# Patient Record
Sex: FEMALE | Race: WHITE
[De-identification: names, ages, dates, MRNs, and addresses within clinical notes are randomized per-mention and may not be internally consistent; named-entity substitution may affect disease eponyms.]

---

## 2017-09-24 ENCOUNTER — HOSPITAL ENCOUNTER (INPATIENT)
Dept: HOSPITAL 31 - C.ER | Age: 82
LOS: 4 days | Discharge: HOME | DRG: 292 | End: 2017-09-28
Payer: MEDICARE

## 2017-09-24 VITALS — BODY MASS INDEX: 39 KG/M2

## 2017-09-24 DIAGNOSIS — Z79.82: ICD-10-CM

## 2017-09-24 DIAGNOSIS — J45.901: ICD-10-CM

## 2017-09-24 DIAGNOSIS — Z79.51: ICD-10-CM

## 2017-09-24 DIAGNOSIS — E78.00: ICD-10-CM

## 2017-09-24 DIAGNOSIS — Z99.81: ICD-10-CM

## 2017-09-24 DIAGNOSIS — I48.0: ICD-10-CM

## 2017-09-24 DIAGNOSIS — J44.9: ICD-10-CM

## 2017-09-24 DIAGNOSIS — I27.2: ICD-10-CM

## 2017-09-24 DIAGNOSIS — E66.01: ICD-10-CM

## 2017-09-24 DIAGNOSIS — Z79.899: ICD-10-CM

## 2017-09-24 DIAGNOSIS — I34.0: ICD-10-CM

## 2017-09-24 DIAGNOSIS — Z79.01: ICD-10-CM

## 2017-09-24 DIAGNOSIS — I50.43: ICD-10-CM

## 2017-09-24 DIAGNOSIS — E11.65: ICD-10-CM

## 2017-09-24 DIAGNOSIS — Z79.84: ICD-10-CM

## 2017-09-24 DIAGNOSIS — Z79.83: ICD-10-CM

## 2017-09-24 DIAGNOSIS — I11.0: Primary | ICD-10-CM

## 2017-09-24 DIAGNOSIS — E11.40: ICD-10-CM

## 2017-09-24 LAB
ALBUMIN/GLOB SERPL: 1.3 {RATIO} (ref 1–2.1)
ALP SERPL-CCNC: 41 U/L (ref 38–126)
ALT SERPL-CCNC: 55 U/L (ref 9–52)
AST SERPL-CCNC: 34 U/L (ref 14–36)
BASE EXCESS BLDV CALC-SCNC: 1 MMOL/L (ref 0–2)
BASOPHILS # BLD AUTO: 0.1 K/UL (ref 0–0.2)
BASOPHILS NFR BLD: 1 % (ref 0–2)
BILIRUB SERPL-MCNC: 0.5 MG/DL (ref 0.2–1.3)
BUN SERPL-MCNC: 21 MG/DL (ref 7–17)
CALCIUM SERPL-MCNC: 8.7 MG/DL (ref 8.6–10.4)
CHLORIDE SERPL-SCNC: 100 MMOL/L (ref 98–107)
CO2 SERPL-SCNC: 22 MMOL/L (ref 22–30)
EOSINOPHIL # BLD AUTO: 0.5 K/UL (ref 0–0.7)
EOSINOPHIL NFR BLD: 5.6 % (ref 0–4)
ERYTHROCYTE [DISTWIDTH] IN BLOOD BY AUTOMATED COUNT: 14.8 % (ref 11.5–14.5)
GLOBULIN SER-MCNC: 2.9 GM/DL (ref 2.2–3.9)
GLUCOSE SERPL-MCNC: 82 MG/DL (ref 65–105)
HCT VFR BLD CALC: 28.9 % (ref 34–47)
LYMPHOCYTES # BLD AUTO: 1.8 K/UL (ref 1–4.3)
LYMPHOCYTES NFR BLD AUTO: 18.6 % (ref 20–40)
MCH RBC QN AUTO: 28.9 PG (ref 27–31)
MCHC RBC AUTO-ENTMCNC: 33.8 G/DL (ref 33–37)
MCV RBC AUTO: 85.6 FL (ref 81–99)
MONOCYTES # BLD: 0.7 K/UL (ref 0–0.8)
MONOCYTES NFR BLD: 7.3 % (ref 0–10)
NRBC BLD AUTO-RTO: 0 % (ref 0–2)
PCO2 BLDV: 48 MMHG (ref 40–60)
PH BLDV: 7.36 [PH] (ref 7.32–7.43)
PLATELET # BLD: 319 K/UL (ref 130–400)
PMV BLD AUTO: 7.9 FL (ref 7.2–11.7)
POTASSIUM SERPL-SCNC: 4.8 MMOL/L (ref 3.6–5.2)
PROT SERPL-MCNC: 6.7 G/DL (ref 6.3–8.3)
SODIUM SERPL-SCNC: 136 MMOL/L (ref 132–148)
WBC # BLD AUTO: 9.7 K/UL (ref 4.8–10.8)

## 2017-09-24 RX ADMIN — INSULIN ASPART SCH UNIT: 100 INJECTION, SOLUTION INTRAVENOUS; SUBCUTANEOUS at 17:00

## 2017-09-24 RX ADMIN — INSULIN ASPART SCH: 100 INJECTION, SOLUTION INTRAVENOUS; SUBCUTANEOUS at 22:49

## 2017-09-24 NOTE — C.PDOC
History Of Present Illness


Patient is an 88 y/o female, with a Hx of CHF and asthma, who presents to the 

ED with complaints of recurring SOB for the last 3-4 days. By , 

patient experiences dyspnea on exertion and recurring bilateral pedal edema; 

denies CP, fever, or cough.  states patient uses home O2 tank only at 

night and takes Lasix 20 mg every other day. Patient has no other physical 

complaints at this time.  





VIA TRANS





recur sob x 3-4 DAYS. HO PRIOR CHF, ASTHMA. +DAVEY, RECUR B/L FEET SWELLING. NO CP

, FEVER, COUGH. USES HOME O2 "ONLY AT NIGHT". ON LASIX 20 MG QOD





EXAM


MILD RESP DIST NONTOXIC


LUNGS +TACHYPNEA SPEAKING FULL SENTENCES. +SCATTERED OCC EXP WHEEZE, +RALES


+2+PITTING EDEMA B/L LE


REMAINDE RNEG


Time Seen by Provider: 17 07:27


Chief Complaint (Nursing): Shortness Of Breath


History Per: Patient


History/Exam Limitations: no limitations


Onset/Duration Of Symptoms: Days (3-4 days)


Current Symptoms Are (Timing): Still Present


Exacerbating Factor(s): Exertion (DAVEY)


Associated Symptoms: denies: Chest Pain


Recent travel outside of the [place]: No





Past Medical History


Reviewed: Historical Data, Nursing Documentation, Vital Signs


Vital Signs: 


 Last Vital Signs











Temp  97.6 F   17 23:15


 


Pulse  89   17 04:22


 


Resp  18   17 05:10


 


BP  110/70   17 23:15


 


Pulse Ox  96   17 07:20














- Medical History


PMH: Arthritis, Asthma, CHF, HTN, Hypercholesterolemia


Surgical History: Cholecystectomy





- [facility] Procedures








ENDOSC POLYPECTOMY OF LG INTEST (07)








Family History: States: No Known Family Hx





- Social History


Hx Alcohol Use: No


Hx Substance Use: No





- Immunization History


Hx Tetanus Toxoid Vaccination: Yes


Hx Influenza Vaccination: Yes


Hx Pneumococcal Vaccination: Yes





Review Of Systems


Except As Marked, All Systems Reviewed And Found Negative.


Constitutional: Negative for: Fever


Cardiovascular: Negative for: Chest Pain


Respiratory: Positive for: Shortness of Breath, SOB with Excertion.  Negative 

for: Cough





Physical Exam





- Physical Exam


Appears: Non-toxic, In Acute Distress (mild respiratory distress)


Skin: Normal Color, Warm, Dry


Head: Atraumatic, Normacephalic


Oral Mucosa: Moist


Respiratory: Rales, Wheezing (scattered occasional expiratory wheeze), Other (

tachypnea)


Extremity: Pedal Edema (+2 pitting pedal edema of bilateral LE)


Neurological/Psych: Oriented x3, Normal Speech (speaking full sentences)





ED Course And Treatment





- Laboratory Results


Result Diagrams: 


 17 08:12





 17 08:12


ECG: Interpreted By Me


ECG Rhythm: Sinus Rhythm


ECG Interpretation: Normal


Rate From EC


O2 Sat by Pulse Oximetry: 96 (room air)


Pulse Ox Interpretation: Normal (2L)





- Radiology


CXR: Interpreted by Me


CXR Interpretation: Yes: Other (chf)


Progress Note: EKG ordered; Nebulizer treatment and High Flow NS administered. 

Medrol, Albuterol, and Lasix administered. Call for admission by Dr. Allen.





Progress





- Re-Evaluation


Re-evaluation Note: 





17 09:18


PT REFUSING BEDPAN USE, PERSIST DAVEY FROM STRETCHER TO BEDSIDE COMMODE BUT FEELS 

BETTER W VAPOTHERM.





D/W PMD WILL ADMIT





- Data Reviewed


Data Reviewed: Lab, Diagnostic imaging, EKG, Old records





- Continuity of Care


Discussed patient case with:: Patient, Covering for PMD





Disposition


Counseled Patient/Family Regarding: Studies Performed, Diagnosis





- Disposition


Disposition: HOSPITALIZED


Disposition Time: 09:19


Condition: STABLE





- POA


Present On Arrival: None





- Clinical Impression


Clinical Impression: 


 CHF exacerbation, Asthma exacerbation








- Scribe Statement


The provider has reviewed the documentation as recorded by the Scribe





Marga Ruffin 





All medical record entries made by the Scribe were at my direction and 

personally dictated by me. I have reviewed the chart and agree that the record 

accurately reflects my personal performance of the history, physical exam, 

medical decision making, and the department course for this patient. I have 

also personally directed, reviewed, and agree with the discharge instructions 

and disposition.





Decision To Admit





- Pt Status Changed To:


Hospital Disposition Of: Observation





- .


Bed Request Type: Telemetry


Admitting Physician: Howard Day


Patient Diagnosis: 


 CHF exacerbation, Asthma exacerbation

## 2017-09-24 NOTE — RAD
PROCEDURE:  CHEST RADIOGRAPH, 1 VIEW



HISTORY:

Shortness of breath 



COMPARISON:

09/24/2017



FINDINGS:



LUNGS:

Prominent diffuse increased interstitial lung markings bilaterally 

suggestive for moderate to severe venous congestion.  Patchy 

consolidative changes within the right mid and lower lung zones. 

Small bilateral pleural effusions.  Additional patchy consolidative 

change at the left lung base.



PLEURA:

As above.



CARDIOVASCULAR:

Cardiomegaly.  Enlarged ectatic aorta.



OSSEOUS STRUCTURES:

No significant abnormalities.



VISUALIZED UPPER ABDOMEN:

Normal.



OTHER FINDINGS:

None. 



IMPRESSION:

Prominent diffuse increased interstitial lung markings bilaterally 

suggestive for moderate to severe venous congestion.  Patchy 

consolidative changes within the right mid and lower lung zones. 

Small bilateral pleural effusions.  Additional patchy consolidative 

change at the left lung base.



Cardiomegaly.  Enlarged ectatic aorta.

## 2017-09-25 RX ADMIN — DIGOXIN SCH: 0.12 TABLET ORAL at 18:18

## 2017-09-25 RX ADMIN — INSULIN ASPART SCH: 100 INJECTION, SOLUTION INTRAVENOUS; SUBCUTANEOUS at 23:00

## 2017-09-25 RX ADMIN — Medication SCH TAB: at 11:26

## 2017-09-25 RX ADMIN — INSULIN ASPART SCH: 100 INJECTION, SOLUTION INTRAVENOUS; SUBCUTANEOUS at 08:25

## 2017-09-25 RX ADMIN — INSULIN ASPART SCH: 100 INJECTION, SOLUTION INTRAVENOUS; SUBCUTANEOUS at 17:30

## 2017-09-25 RX ADMIN — INSULIN ASPART SCH: 100 INJECTION, SOLUTION INTRAVENOUS; SUBCUTANEOUS at 11:45

## 2017-09-26 LAB
BASOPHILS # BLD AUTO: 0.1 K/UL (ref 0–0.2)
BASOPHILS NFR BLD: 0.7 % (ref 0–2)
BUN SERPL-MCNC: 35 MG/DL (ref 7–17)
CALCIUM SERPL-MCNC: 8.9 MG/DL (ref 8.6–10.4)
CHLORIDE SERPL-SCNC: 93 MMOL/L (ref 98–107)
CO2 SERPL-SCNC: 31 MMOL/L (ref 22–30)
EOSINOPHIL # BLD AUTO: 0.5 K/UL (ref 0–0.7)
EOSINOPHIL NFR BLD: 3.9 % (ref 0–4)
ERYTHROCYTE [DISTWIDTH] IN BLOOD BY AUTOMATED COUNT: 14.4 % (ref 11.5–14.5)
GLUCOSE SERPL-MCNC: 120 MG/DL (ref 65–105)
HCT VFR BLD CALC: 29.5 % (ref 34–47)
LYMPHOCYTES # BLD AUTO: 3.2 K/UL (ref 1–4.3)
LYMPHOCYTES NFR BLD AUTO: 26.4 % (ref 20–40)
MCH RBC QN AUTO: 28.3 PG (ref 27–31)
MCHC RBC AUTO-ENTMCNC: 33.1 G/DL (ref 33–37)
MCV RBC AUTO: 85.4 FL (ref 81–99)
MONOCYTES # BLD: 1 K/UL (ref 0–0.8)
MONOCYTES NFR BLD: 8 % (ref 0–10)
NRBC BLD AUTO-RTO: 0 % (ref 0–2)
PLATELET # BLD: 325 K/UL (ref 130–400)
PMV BLD AUTO: 7.7 FL (ref 7.2–11.7)
POTASSIUM SERPL-SCNC: 4.2 MMOL/L (ref 3.6–5.2)
SODIUM SERPL-SCNC: 135 MMOL/L (ref 132–148)
WBC # BLD AUTO: 11.9 K/UL (ref 4.8–10.8)

## 2017-09-26 RX ADMIN — INSULIN ASPART SCH: 100 INJECTION, SOLUTION INTRAVENOUS; SUBCUTANEOUS at 07:47

## 2017-09-26 RX ADMIN — INSULIN ASPART SCH: 100 INJECTION, SOLUTION INTRAVENOUS; SUBCUTANEOUS at 22:33

## 2017-09-26 RX ADMIN — INSULIN ASPART SCH: 100 INJECTION, SOLUTION INTRAVENOUS; SUBCUTANEOUS at 11:45

## 2017-09-26 RX ADMIN — IPRATROPIUM BROMIDE AND ALBUTEROL SULFATE SCH: .5; 3 SOLUTION RESPIRATORY (INHALATION) at 13:30

## 2017-09-26 RX ADMIN — DIGOXIN SCH MG: 0.12 TABLET ORAL at 18:41

## 2017-09-26 RX ADMIN — INSULIN ASPART SCH: 100 INJECTION, SOLUTION INTRAVENOUS; SUBCUTANEOUS at 17:30

## 2017-09-26 RX ADMIN — Medication SCH TAB: at 10:10

## 2017-09-26 RX ADMIN — IPRATROPIUM BROMIDE AND ALBUTEROL SULFATE SCH ML: .5; 3 SOLUTION RESPIRATORY (INHALATION) at 19:46

## 2017-09-26 NOTE — HP
HISTORY OF PRESENT ILLNESS:  This is an 87 years old [place] female with

history of multiple medical problems presented to the emergency room with

symptoms of shortness of breath that was progressed over 2 days.  The

patient was evaluated in the emergency room and she was found to have

congestive heart failure subsequently the patient was admitted to telemetry

floor.  While the patient was in telemetry, she developed atrial

fibrillation with rapid ventricular rate.  The patient was started on both

digoxin as well as metoprolol, continue the metoprolol and she was started

on Eliquis 2.5 mg twice a day.  The patient denied to have any cough or

expectoration.  No fever.



REVIEW OF SYSTEMS:  All other review of systems is negative except for

dysfunctional gait with need for walking assistance with a walker or a

cane.



ALLERGIES:  NO KNOWN ALLERGIES.



HOME MEDICATIONS:  Include Symbicort 1 puff twice a day, bisoprolol 5 mg

daily, atorvastatin 20 mg daily, aspirin 81 mg daily, gabapentin 600 mg 3

times a day, alendronate 70 mg daily, Lasix 20 mg daily, glimepiride 2 mg

twice a day, Singulair 10 mg daily, and losartan and hydrochlorothiazide

50/12.5 daily, and multivitamin 1 tablet daily.



PAST MEDICAL HISTORY:  Type 2 diabetes mellitus, hypertension, diabetic

neuropathy, hypercholesterolemia, and COPD.



SOCIAL HISTORY:  No history of smoking, ETOH, or substance abuse.



FAMILY HISTORY:  Noncontributory.



PHYSICAL EXAMINATION:

GENERAL:  The patient is in bed, comfortable, and not in any

cardiopulmonary distress.

VITAL SIGNS:  At the time of this examination with blood pressure 100/56,

temperature 98.7, respiratory rate 20, and pulse is 70.

HEENT:  Pupils equal and reactive to light.  Normal appearing mucosa of the

conjunctivae, oropharyngeal, and nasal membrane mucosa.

NECK:  Supple.  No JVD.  No carotid bruit.  No lymph node.  No thyromegaly.

CHEST AND LUNGS:  Bilateral symmetrical expansion, few basilar rales.

CARDIOVASCULAR:  PMI not localized.  S1 and S2.  No additional sounds.

ABDOMEN:  Normoactive bowel sounds.  No tenderness.  No organomegaly.  No

masses.

EXTREMITIES:  No cyanosis.  No clubbing.  No edema.

CENTRAL NERVOUS SYSTEM:  Alert, awake, and oriented x2.  No neurological

deficit could be appreciated.



ASSESSMENT:

1.  Congestive heart failure both systolic and diastolic, acute on top of

chronic.

2.  Chronic obstructive pulmonary disease.

3.  _____.

4.  Status post hypertension.

5.  Atrial fibrillation with rapid ventricular rate.



PLAN:  Continue diuretics.  We will give the patient another dose of

digoxin 0.25 mg.  Continue Eliquis and discussed the patient's condition

with her son at the bedside.







__________________________________________

[facility] MD Shay





DD:  09/25/2017 22:10:53

DT:  09/26/2017 3:46:22

Job # [id number]

## 2017-09-27 VITALS — RESPIRATION RATE: 20 BRPM

## 2017-09-27 VITALS — HEART RATE: 112 BPM

## 2017-09-27 RX ADMIN — INSULIN ASPART SCH: 100 INJECTION, SOLUTION INTRAVENOUS; SUBCUTANEOUS at 12:51

## 2017-09-27 RX ADMIN — DIGOXIN SCH MG: 0.12 TABLET ORAL at 18:36

## 2017-09-27 RX ADMIN — IPRATROPIUM BROMIDE AND ALBUTEROL SULFATE SCH ML: .5; 3 SOLUTION RESPIRATORY (INHALATION) at 19:27

## 2017-09-27 RX ADMIN — IPRATROPIUM BROMIDE AND ALBUTEROL SULFATE SCH: .5; 3 SOLUTION RESPIRATORY (INHALATION) at 07:21

## 2017-09-27 RX ADMIN — IPRATROPIUM BROMIDE AND ALBUTEROL SULFATE SCH ML: .5; 3 SOLUTION RESPIRATORY (INHALATION) at 13:22

## 2017-09-27 RX ADMIN — INSULIN ASPART SCH: 100 INJECTION, SOLUTION INTRAVENOUS; SUBCUTANEOUS at 08:12

## 2017-09-27 RX ADMIN — INSULIN ASPART SCH: 100 INJECTION, SOLUTION INTRAVENOUS; SUBCUTANEOUS at 21:39

## 2017-09-27 RX ADMIN — IPRATROPIUM BROMIDE AND ALBUTEROL SULFATE SCH ML: .5; 3 SOLUTION RESPIRATORY (INHALATION) at 02:12

## 2017-09-27 RX ADMIN — INSULIN ASPART SCH: 100 INJECTION, SOLUTION INTRAVENOUS; SUBCUTANEOUS at 18:39

## 2017-09-27 RX ADMIN — Medication SCH TAB: at 09:40

## 2017-09-27 NOTE — CARD
--------------- APPROVED REPORT --------------





EXAM: Two-dimensional and M-mode echocardiogram with Doppler and 

color Doppler.



Other Information 

Quality : GoodRhythm : NSR



INDICATION

Dyspnea Congestive Heart Failure COPD  arthritis



RISK FACTORS

Hyperlipidemia



2D DIMENSIONS 

IVSd1.2   (0.7-1.1cm)LVDd4.0   (3.9-5.9cm)

LVOT Diameter2.0   (1.8-2.4cm)PWd1.1   (0.7-1.1cm)

LVDs2.6   (2.5-4.0cm)FS (%) 35.0   %

LVEF (%)64.9   (>50%)



M-Mode DIMENSIONS 

Left Atrium (MM)4.23   (2.5-4.0cm)Aortic Root3.26   (2.2-3.7cm)

Aortic Cusp Exc.1.54   (1.5-2.0cm)



Aortic Valve

AoV Peak Myniuhnn958.1cm/sAoV VTI48.6cmAO Peak GR.29mmHg

LVOT Peak Jawibldq372.9cm/sLVOT VTI24.10cmAO Mean GR.18mmHg

DEDE (VMAX)1.42yr3BVG (VTI)1.34zr7RK P 1/2 Jbvp570qe



Mitral Valve

E/A ratio0.0



TDI

E/Lateral E'0.0E/Medial E'0.0



Tricuspid Valve

TR Peak Ccwmveja489dw/sTR Peak Gr.24iyFhRQXJ76cfNu



 LEFT VENTRICLE 

The left ventricle is normal size.

There is mild concentric left ventricular hypertrophy.

The left ventricular function is normal.

The left ventricular ejection fraction is within the normal range.

There is normal LV segmental wall motion.

Tissue Doppler imaging reveals mild left ventricular diastolic 

dysfunction.



 RIGHT VENTRICLE 

The right ventricle is normal size.

There is normal right ventricular wall thickness.

The right ventricular systolic function is normal.



 ATRIA 

The left atrium is moderately dilated.

The right atrium is mildly dilated.

The interatrial septum is intact with no evidence for an atrial 

septal defect.



 AORTIC VALVE 

The aortic valve is calcified and displays decreased opening.

There is mild to moderate aortic regurgitation.

There is moderate valvular aortic stenosis.



 MITRAL VALVE 

Mitral annular calcification is moderate.

The mitral valve leaflets are calcified.

THE POSTERIOR MV LEAFLET APPEARS MODERATELY RESTRICTED.

There is no evidence of mitral valve prolapse.

There is no mitral valve stenosis.

Mitral regurgitation is severe.



 TRICUSPID VALVE 

The tricuspid valve is normal in structure.

There is mild tricuspid regurgitation.

There is severe pulmonary hypertension.

There is no tricuspid valve prolapse or vegetation.

There is no tricuspid valve stenosis. 



 PULMONIC VALVE 

The pulmonary valve is normal in structure.

There is mild pulmonic valvular regurgitation. 

There is no pulmonic valvular stenosis.



 GREAT VESSELS 

The aortic root is normal in size.

The ascending aorta is normal in size.

IVC IS DILATED WITH DECREASED RESP COLLAPSE.  RAP ESTIMATED AT 20 MMHG



<Conclusion>

The left ventricular ejection fraction is within the normal range.

Tissue Doppler imaging reveals mild left ventricular diastolic 

dysfunction.

There is mild concentric left ventricular hypertrophy.

The left atrium is moderately dilated.

The right atrium is mildly dilated.

The aortic valve is calcified and displays decreased opening.

There is mild to moderate aortic regurgitation.

There is moderate valvular aortic stenosis.

Mitral annular calcification is moderate.

The mitral valve leaflets are calcified.

THE POSTERIOR MV LEAFLET APPEARS MODERATELY RESTRICTED.

Mitral regurgitation is severe.

There is mild tricuspid regurgitation.

There is severe pulmonary hypertension.

THE POSTERIOR MV LEAFLET APPEARS MODERATELY RESTRICTED.

IVC IS DILATED WITH DECREASED RESP COLLAPSE.  RAP ESTIMATED AT 20 

MMHG

## 2017-09-27 NOTE — PN
DATE:  09/26/2017



DAILY PROGRESS NOTE



SUBJECTIVE:  The patient is seen today on 09/26/2017.  She is less short of

breath, but the patient has bilateral lower extremity swelling.



PHYSICAL EXAMINATION:

VITAL SIGNS:  Blood pressure is 140/70, temperature 98.2, respiratory rate

20, and pulse 78.

HEENT:  Pupils equal and reactive to light.  Normal-appearing mucosa of the

conjunctiva, oropharynx, and nasal membrane mucosa.

NECK:  Supple.  No JVD.  No carotid bruit.  No lymph node.  No thyromegaly.

CHEST AND LUNGS:  Bilateral symmetrical expansion.  Good air exchange.  No

rales.  No rhonchi.

CARDIOVASCULAR:  PMI not localized.  S1 and S2.  No additional sounds.

ABDOMEN:  Normoactive bowel sounds.  No tenderness.  No organomegaly.  No

masses.

EXTREMITIES:  No cyanosis.  No clubbing.  No edema.

CNS:  Alert, awake, and oriented x2.  No neurological deficit could be

appreciated.



ASSESSMENT:

1.  Atrial fibrillation with rapid ventricular rate.

2.  Exacerbation of congestive heart failure.

3.  Acute on chronic systolic and diastolic.



PLAN:  Continue current medications including the diuretics and digoxin and

Eliquis.  Discussed with the patient and her daughter at the bedside.







__________________________________________

Howard Day MD



DD:  09/26/2017 21:11:55

DT:  09/27/2017 0:39:41

Job # [id number]

## 2017-09-27 NOTE — CON
DATE:



CARDIOLOGY CONSULT



REASON FOR CONSULTATION:  New onset atrial fibrillation.



HISTORY OF PRESENT ILLNESS:  The patient is 87 years old morbidly obese,

Chinese female, who has a history of chronic obstructive lung disease, on

home nasal O2 for the past 20 years according to the patient.  The patient

is unaware of any prior cardiac history and she follows with Dr. Rossi as

an outpatient.  The patient developed rapid atrial fibrillation last night.

The patient was started on Lanoxin and Eliquis.  The patient denies any

retrosternal chest pain.  Denies any dizziness or syncope recently.  The

patient is unaware of any history of coronary artery disease or history of

stroke in the past.



SOCIAL HISTORY:  Nonsmoker.



MEDICATIONS:  Amaryl 1 mg daily, aspirin 81 mg once a day, Cozaar 50 mg

once a day, Crestor 10 mg once a day, albuterol inhaler, Eliquis 2.5 mg

twice a day, Lanoxin 0.125 mg once a day, Lasix 20 mg intravenously twice a

day, hydrochlorothiazide 12.5 mg once a day, and Zebeta 5 mg daily.



REVIEW OF SYSTEMS:  No fever or chills.  No vomiting or diarrhea.  No

retrosternal chest pain.  No productive cough.



PHYSICAL EXAMINATION:

GENERAL:  The patient is an elderly female who does not appear to be in

acute distress.

VITAL SIGNS:  Blood pressure 134/72, heart rate 96, temperature 98,

respirations 18.

HEENT:  Pink conjunctiva.

CHEST:  Minimal coarse crepitations basally.

HEART:  S1 and S2 regular.

ABDOMEN:  Soft.

EXTREMITIES:  2 to 3+ pitting edema.



LABORATORY DATA:  SMA-7, sodium 135, potassium 4.2, chloride 93, CO2 31,

glucose 120, BUN 35, creatinine 1.0.  Hemoglobin and hematocrit 9.8 and

29.5, white count is 11.5, platelet count 325,000.  Chest x-ray revealed

borderline cardiomegaly, moderate CHF, bilateral lower lobe infiltrate can

be excluded.  EKG on admission on the 24th revealed sinus rhythm at rate of

77.  The follow on EKG later on the same day revealed atrial fibrillation

with rapid ventricular response at the rate 124.  Preliminary

echocardiographic study reveled normal ejection fraction, severe pulmonary

hypertension, severe mitral insufficiency, significantly dilated left

atrium, and mild aortic stenosis.













ASSESSMENT:

1.  New onset atrial fibrillation.

2.  Advanced chronic obstructive lung disease.

3.  Rule out underlying pneumonia.

4.  Severe mitral insufficiency.

5.  Diabetes mellitus.



CONDITIONS:  Continue current aspirin 81 mg once a day, Cozaar 50 mg once a

day, Crestor 2 mg once a day, Eliquis 2.5 mg once a day, Lanoxin 0.125 mg

once a day, Lasix 20 mg intravenously twice a day, Zebeta 5 mg daily. 

Obtain TSH level.  I will administer one stat dose of 40 mg of Lasix. 

Obtain venous Doppler of lower extremities as well as TSH level.





__________________________________________

Prashant Potter MD



DD:  09/26/2017 16:31:33

DT:  09/26/2017 19:29:04

Job # [id number]

## 2017-09-27 NOTE — PN
DATE:



SUBJECTIVE:  The patient denies any dizziness or palpitation.



PHYSICAL EXAMINATION:

VITAL SIGNS:  Blood pressure 103/58, heart rate 78, temperature 98,

respirations 18.

HEENT:  Pale conjunctivae.

CHEST:  Bilateral rhonchi.

HEART:  S1 and S2 regular.

ABDOMEN:  Soft.

EXTREMITIES:  2+ pitting edema.



LABORATORY DATA:  Today's blood sugar is 115.  TSH level is within normal

limits.



ASSESSMENT:

1.  Paroxysmal atrial fibrillation.

2.  Chronic obstructive lung disease and right-sided heart failure.

3.  Uncontrolled diabetes mellitus.

4.  Rule out deep venous thrombosis.

5.  Severe mitral insufficiency.



RECOMMENDATIONS:  Continue current Cozaar 50 mg once a day, Crestor 10 mg

once a day, Eliquis 2.5 mg twice a day, digoxin 0.125 mg once a day, Lasix

20 mg twice a day, hydrochlorothiazide 12.5 mg once a day.  Venous Doppler

of lower extremities is still pending.





__________________________________________

Prashant Potter MD







DD:  09/27/2017 11:38:13

DT:  09/27/2017 12:02:47

Job # [id number]

## 2017-09-28 VITALS — DIASTOLIC BLOOD PRESSURE: 64 MMHG | SYSTOLIC BLOOD PRESSURE: 116 MMHG

## 2017-09-28 VITALS — TEMPERATURE: 97.8 F | OXYGEN SATURATION: 95 % | HEART RATE: 98 BPM

## 2017-09-28 RX ADMIN — IPRATROPIUM BROMIDE AND ALBUTEROL SULFATE SCH ML: .5; 3 SOLUTION RESPIRATORY (INHALATION) at 01:31

## 2017-09-28 RX ADMIN — INSULIN ASPART SCH: 100 INJECTION, SOLUTION INTRAVENOUS; SUBCUTANEOUS at 11:45

## 2017-09-28 RX ADMIN — Medication SCH TAB: at 10:44

## 2017-09-28 RX ADMIN — IPRATROPIUM BROMIDE AND ALBUTEROL SULFATE SCH: .5; 3 SOLUTION RESPIRATORY (INHALATION) at 07:19

## 2017-09-28 RX ADMIN — INSULIN ASPART SCH: 100 INJECTION, SOLUTION INTRAVENOUS; SUBCUTANEOUS at 08:00

## 2017-09-28 RX ADMIN — IPRATROPIUM BROMIDE AND ALBUTEROL SULFATE SCH ML: .5; 3 SOLUTION RESPIRATORY (INHALATION) at 13:18

## 2017-09-28 NOTE — CARD
--------------- APPROVED REPORT --------------





EKG Measurement

Heart [id number]

OR [id number]

[id number]

[id number]

[id number]



<Conclusion>

Normal sinus rhythm

Normal ECG

## 2017-09-28 NOTE — PCM.HF
Heart Failure Core Measure





- Heart Failure


Ejection Fraction: 40 % or Greater (EF >60%)


ACE Inhibitor Prescribed: No


Contraindication/Reason for not providing: ON ARB


Beta-Blocker Prescribed: Bisoprolol


Angiotensin II Receptor Blocker Prescribed: Yes


Aldosterone Antagonist Prescribed: No


Hydralazine Nitrate Prescribed: No


Contraindication/Reason for not providing: EF>40%


Implantable Cardioverter Defibrillator Therapy: No


Contraindication/Reason for not providing: EF>40%


Cardiac Resynchronization Therapy Prescribed: No


Contraindication/Reason for not providing: not indicated

## 2017-09-28 NOTE — CP.PCM.PN
Subjective





- Date & Time of Evaluation


Date of Evaluation: 09/28/17


Time of Evaluation: 11:00





- Subjective


Subjective: 





Alert, awake, denies sob or chest pains, pedal edema improving.





Objective





- Vital Signs/Intake and Output


Vital Signs (last 24 hours): 


 











Temp Pulse Resp BP Pulse Ox


 


 97.8 F   98 H  20   116/64   95 


 


 09/28/17 07:20  09/28/17 07:20  09/28/17 07:20  09/28/17 10:44  09/28/17 07:20











- Labs


Labs: 


 





 09/26/17 07:26 





 09/26/17 07:26 











Assessment and Plan





- Assessment and Plan (Free Text)


Assessment: 





Patient is seen and examined. Alert, orientedx3, denies pain or distress. HR 

RUNS AT 90'S added cardizem to the home medications. D/W DR Day, plan to 

discharge home today. Advised to follow up in the office in 1 week.

## 2017-09-28 NOTE — PN
DATE:  09/27/2017



SUBJECTIVE:  The patient is seen today, 09/27/2017.  She continued to

improve.  Echocardiogram showed pulmonary hypertension and mitral

regurgitation.



PHYSICAL EXAMINATION:

VITAL SIGNS:  Blood pressure 130/70, temperature 98.2, respiratory rate 18,

and pulse is 80.

HEENT:  Pupils are equal and reactive to light.  Normal-appearing mucosa of

the conjunctivae, oropharynx and nasal membrane mucosa.

NECK:  Supple.  No JVD.  No carotid bruits.  No lymph nodes.  No

thyromegaly.

CHEST AND LUNGS:  Bilateral symmetrical expansion.  Good air exchange.  No

rales, no rhonchi.

CARDIOVASCULAR SYSTEM:  PMI not localized.  S1 and S2.  No additional

sounds.

ABDOMEN:  Normoactive bowel sounds.  No tenderness.  No organomegaly.  No

masses.

EXTREMITIES:  No cyanosis, no clubbing, no edema.

CENTRAL NERVOUS SYSTEM:  Alert, awake, and oriented x3.  No neurological

deficits could be appreciated.



ASSESSMENT:

1.  Exacerbation of diastolic congestive heart failure, acute-on-chronic.

2.  Atrial fibrillation with rapid ventricular rate.

3.  Severe pulmonary hypertension.



PLAN:  Continue the current management and add Cardizem 30 mg q. 8 hours. 

Follow cardiology recommendations.





__________________________________________

Howard Day MD



DD:  09/27/2017 22:53:28

DT:  09/28/2017 1:04:10

Job # [id number]

## 2018-01-08 ENCOUNTER — HOSPITAL ENCOUNTER (INPATIENT)
Dept: HOSPITAL 31 - C.ER | Age: 83
LOS: 4 days | Discharge: HOME | DRG: 293 | End: 2018-01-12
Payer: MEDICARE

## 2018-01-08 VITALS — BODY MASS INDEX: 37.8 KG/M2

## 2018-01-08 VITALS — HEART RATE: 112 BPM

## 2018-01-08 DIAGNOSIS — J45.909: ICD-10-CM

## 2018-01-08 DIAGNOSIS — I11.0: Primary | ICD-10-CM

## 2018-01-08 DIAGNOSIS — E11.9: ICD-10-CM

## 2018-01-08 DIAGNOSIS — I08.0: ICD-10-CM

## 2018-01-08 DIAGNOSIS — I48.0: ICD-10-CM

## 2018-01-08 DIAGNOSIS — I25.10: ICD-10-CM

## 2018-01-08 DIAGNOSIS — I50.43: ICD-10-CM

## 2018-01-08 DIAGNOSIS — I27.20: ICD-10-CM

## 2018-01-08 DIAGNOSIS — E78.00: ICD-10-CM

## 2018-01-08 DIAGNOSIS — M19.90: ICD-10-CM

## 2018-01-08 LAB
ALBUMIN SERPL-MCNC: 4 G/DL (ref 3.5–5)
ALBUMIN/GLOB SERPL: 1.1 {RATIO} (ref 1–2.1)
ALT SERPL-CCNC: 18 U/L (ref 9–52)
AST SERPL-CCNC: 26 U/L (ref 14–36)
BASOPHILS # BLD AUTO: 0 K/UL (ref 0–0.2)
BASOPHILS NFR BLD: 0.6 % (ref 0–2)
BNP SERPL-MCNC: 6810 PG/ML (ref 0–900)
BUN SERPL-MCNC: 23 MG/DL (ref 7–17)
CALCIUM SERPL-MCNC: 8.6 MG/DL (ref 8.6–10.4)
EOSINOPHIL # BLD AUTO: 0.1 K/UL (ref 0–0.7)
EOSINOPHIL NFR BLD: 2 % (ref 0–4)
ERYTHROCYTE [DISTWIDTH] IN BLOOD BY AUTOMATED COUNT: 16.1 % (ref 11.5–14.5)
GFR NON-AFRICAN AMERICAN: 59
HGB BLD-MCNC: 11.7 G/DL (ref 11–16)
LYMPHOCYTES # BLD AUTO: 2.3 K/UL (ref 1–4.3)
LYMPHOCYTES NFR BLD AUTO: 31.5 % (ref 20–40)
MCH RBC QN AUTO: 27.8 PG (ref 27–31)
MCHC RBC AUTO-ENTMCNC: 33.1 G/DL (ref 33–37)
MCV RBC AUTO: 84.1 FL (ref 81–99)
MONOCYTES # BLD: 0.5 K/UL (ref 0–0.8)
MONOCYTES NFR BLD: 7.2 % (ref 0–10)
NEUTROPHILS # BLD: 4.3 K/UL (ref 1.8–7)
NEUTROPHILS NFR BLD AUTO: 58.7 % (ref 50–75)
NRBC BLD AUTO-RTO: 0.1 % (ref 0–2)
PLATELET # BLD: 239 K/UL (ref 130–400)
PMV BLD AUTO: 9.5 FL (ref 7.2–11.7)
RBC # BLD AUTO: 4.19 MIL/UL (ref 3.8–5.2)
WBC # BLD AUTO: 7.3 K/UL (ref 4.8–10.8)

## 2018-01-08 RX ADMIN — TAZOBACTAM SODIUM AND PIPERACILLIN SODIUM SCH MLS/HR: 250; 2 INJECTION, SOLUTION INTRAVENOUS at 23:15

## 2018-01-08 NOTE — C.PDOC
History Of Present Illness


87 year old female, whose PMHx includes CHF, Hypertension, Aortic Stenosis, 

Mitral Valve Regurgitation, presents to the ED for evaluation of shortness of 

breath which began around 2 weeks ago. Patient satates her stmproms have been 

worsening. As per , who is at bedside, patient has been compliant with 

her medications. Patient denies fever, chills, chest pain, cough. 


Time Seen by Provider: 18 13:18


Chief Complaint (Nursing): Shortness Of Breath


History Per: Patient, Family ( )


History/Exam Limitations: no limitations


Onset/Duration Of Symptoms: Other (2 weeks )


Current Symptoms Are (Timing): Worse


Current Respiratory Medications: See Home Med List


Associated Symptoms: denies: Fever, Chills, Chest Pain, Bloody Cough, 

Productive Cough


Additional History Per: Patient





Past Medical History


Reviewed: Historical Data, Nursing Documentation, Vital Signs


Vital Signs: 


 Last Vital Signs











Temp  98.8 F   18 17:00


 


Pulse  92 H  18 17:00


 


Resp  18   18 17:00


 


BP  127/78   18 17:05


 


Pulse Ox  100   18 19:46














- Medical History


PMH: Arthritis, Asthma, CHF, HTN, Hypercholesterolemia


Surgical History: Cholecystectomy





- [facility] Procedures








ENDOSC POLYPECTOMY OF LG INTEST (07)








Family History: States: Unknown Family Hx





- Social History


Hx Alcohol Use: No


Hx Substance Use: No





- Immunization History


Hx Tetanus Toxoid Vaccination: Yes


Hx Influenza Vaccination: Yes


Hx Pneumococcal Vaccination: Yes





Review Of Systems


Constitutional: Negative for: Fever, Chills


Cardiovascular: Negative for: Chest Pain


Respiratory: Positive for: Shortness of Breath.  Negative for: Cough





Physical Exam





- Physical Exam


Appears: Non-toxic, No Acute Distress, Other (elderly obese female )


Skin: Normal Color, Warm, Dry


Head: Atraumatic, Normacephalic


Eye(s): bilateral: Normal Inspection


Oral Mucosa: Moist


Neck: Supple


Chest: Symmetrical, No Deformity, No Tenderness


Cardiovascular: Rhythm Regular, No Murmur


Respiratory: Normal Breath Sounds, No Rales, No Rhonchi, No Wheezing


Extremity: Normal ROM, Capillary Refill (less than 2 seconds ), Other (mild 

edema to b/l lower extremities )


Neurological/Psych: Oriented x3, Normal Speech, Normal Cognition


Gait: Steady





ED Course And Treatment





- Laboratory Results


Result Diagrams: 


 18 15:45





 18 15:45


Lab Interpretation: Abnormal (trop neg, BNP 6800H)


ECG: Interpreted By Me


ECG Rhythm: Atrial Fibrillation


ECG Interpretation: No Changes From Prior


Rate From EC


O2 Sat by Pulse Oximetry: 100 (on RA)


Pulse Ox Interpretation: Normal





- Radiology


CXR: Interpreted by Me


CXR Interpretation: Yes: Other (+ R sided enlargement, + CHF)





- Other Rad


  ** CXR


X-Ray: Interpreted by Me, Viewed By Me, Read By Radiologist


Interpretation: Impression:  Mild venous congestion.  Patchy increased markings 

at the lung bases with small bilateral pleural effusions.  Bilateral hilar 

prominence.  Tortuous ectatic aorta.  Cardiomegaly.


Progress Note: bisoprolol 5 mg PO, lasix 40 IV


Reevaluation Time: 16:45


Reassessment Condition: Improved





- Physician Consult Information


Outcome Of Conversation: 1645: d/w PMD bridgette Matthews to give lasix and 

bisoprolol and admit tele





Medical Decision Making


Medical Decision Making: 





euvolemia but CHF prob related to moderate aortic stenosis and moderate mitral 

regurg and ++ pulm htn with diastolic dysfunction.





Though BB will improve cardiac output in diastolic dysfunction w normal EJF, pt 

with symptomatic euvolemic CHF related to aortic stenosis is concerning and 

should be cautiously diuresed to avoid robbing her preload.





Disposition


Doctor Will See Patient In The: Hospital


Counseled Patient/Family Regarding: Studies Performed, Diagnosis





- Disposition


Disposition: HOSPITALIZED


Disposition Time: 16:48


Condition: FAIR





- Clinical Impression


Clinical Impression: 


 Chronic congestive heart failure








- Scribe Statement


The provider has reviewed the documentation as recorded by the Scribe (Elizabeth Cleaning)


Provider Attestation: 








All medical record entries made by the Scribe were at my direction and 

personally dictated by me. I have reviewed the chart and agree that the record 

accurately reflects my personal performance of the history, physical exam, 

medical decision making, and the department course for this patient. I have 

also personally directed, reviewed, and agree with the discharge instructions 

and disposition.

## 2018-01-08 NOTE — RAD
Chest x-ray single frontal view 



History: Shortness of breath. 



Comparison: None available. 



Findings: 



Mild venous congestion. 



Patchy increased markings at the lung bases with small bilateral 

pleural effusions. 



Bilateral hilar prominence. 



Tortuous ectatic aorta. 



Cardiomegaly. 



Degenerative changes in the spine and shoulders. 



Impression: 



Mild venous congestion. 



Patchy increased markings at the lung bases with small bilateral 

pleural effusions. 



Bilateral hilar prominence. 



Tortuous ectatic aorta. 



Cardiomegaly.

## 2018-01-09 RX ADMIN — TAZOBACTAM SODIUM AND PIPERACILLIN SODIUM SCH MLS/HR: 250; 2 INJECTION, SOLUTION INTRAVENOUS at 11:24

## 2018-01-09 RX ADMIN — INSULIN ASPART SCH: 100 INJECTION, SOLUTION INTRAVENOUS; SUBCUTANEOUS at 07:35

## 2018-01-09 RX ADMIN — INSULIN ASPART SCH UNIT: 100 INJECTION, SOLUTION INTRAVENOUS; SUBCUTANEOUS at 11:49

## 2018-01-09 RX ADMIN — INSULIN ASPART SCH UNIT: 100 INJECTION, SOLUTION INTRAVENOUS; SUBCUTANEOUS at 16:31

## 2018-01-09 RX ADMIN — INSULIN ASPART SCH: 100 INJECTION, SOLUTION INTRAVENOUS; SUBCUTANEOUS at 22:00

## 2018-01-09 RX ADMIN — IPRATROPIUM BROMIDE AND ALBUTEROL SULFATE PRN ML: .5; 3 SOLUTION RESPIRATORY (INHALATION) at 02:26

## 2018-01-09 RX ADMIN — TAZOBACTAM SODIUM AND PIPERACILLIN SODIUM SCH MLS/HR: 250; 2 INJECTION, SOLUTION INTRAVENOUS at 05:20

## 2018-01-09 RX ADMIN — Medication SCH TAB: at 09:59

## 2018-01-09 RX ADMIN — DILTIAZEM HYDROCHLORIDE SCH MG: 120 CAPSULE, COATED, EXTENDED RELEASE ORAL at 09:58

## 2018-01-09 RX ADMIN — TAZOBACTAM SODIUM AND PIPERACILLIN SODIUM SCH MLS/HR: 250; 2 INJECTION, SOLUTION INTRAVENOUS at 16:34

## 2018-01-10 LAB
ALBUMIN SERPL-MCNC: 4 G/DL (ref 3.5–5)
ALBUMIN/GLOB SERPL: 1.1 {RATIO} (ref 1–2.1)
ALT SERPL-CCNC: 24 U/L (ref 9–52)
AST SERPL-CCNC: 23 U/L (ref 14–36)
BUN SERPL-MCNC: 38 MG/DL (ref 7–17)
CALCIUM SERPL-MCNC: 8.8 MG/DL (ref 8.6–10.4)
GFR NON-AFRICAN AMERICAN: 27

## 2018-01-10 RX ADMIN — TAZOBACTAM SODIUM AND PIPERACILLIN SODIUM SCH MLS/HR: 250; 2 INJECTION, SOLUTION INTRAVENOUS at 22:23

## 2018-01-10 RX ADMIN — INSULIN ASPART SCH: 100 INJECTION, SOLUTION INTRAVENOUS; SUBCUTANEOUS at 11:51

## 2018-01-10 RX ADMIN — TAZOBACTAM SODIUM AND PIPERACILLIN SODIUM SCH MLS/HR: 250; 2 INJECTION, SOLUTION INTRAVENOUS at 10:01

## 2018-01-10 RX ADMIN — INSULIN ASPART SCH UNIT: 100 INJECTION, SOLUTION INTRAVENOUS; SUBCUTANEOUS at 08:02

## 2018-01-10 RX ADMIN — DILTIAZEM HYDROCHLORIDE SCH: 120 CAPSULE, COATED, EXTENDED RELEASE ORAL at 10:00

## 2018-01-10 RX ADMIN — INSULIN ASPART SCH: 100 INJECTION, SOLUTION INTRAVENOUS; SUBCUTANEOUS at 16:39

## 2018-01-10 RX ADMIN — IPRATROPIUM BROMIDE AND ALBUTEROL SULFATE PRN ML: .5; 3 SOLUTION RESPIRATORY (INHALATION) at 10:30

## 2018-01-10 RX ADMIN — TAZOBACTAM SODIUM AND PIPERACILLIN SODIUM SCH MLS/HR: 250; 2 INJECTION, SOLUTION INTRAVENOUS at 00:08

## 2018-01-10 RX ADMIN — INSULIN ASPART SCH: 100 INJECTION, SOLUTION INTRAVENOUS; SUBCUTANEOUS at 21:28

## 2018-01-10 RX ADMIN — Medication SCH TAB: at 09:56

## 2018-01-10 RX ADMIN — TAZOBACTAM SODIUM AND PIPERACILLIN SODIUM SCH MLS/HR: 250; 2 INJECTION, SOLUTION INTRAVENOUS at 04:24

## 2018-01-10 RX ADMIN — TAZOBACTAM SODIUM AND PIPERACILLIN SODIUM SCH MLS/HR: 250; 2 INJECTION, SOLUTION INTRAVENOUS at 16:39

## 2018-01-10 NOTE — CARD
--------------- APPROVED REPORT --------------





EKG Measurement

Heart [id number]

[id number]

[id number]

[id number]



<Conclusion>

Atrial fibrillation

Cannot rule out Inferior infarct, age undetermined

Abnormal ECG

## 2018-01-10 NOTE — HP
HISTORY OF PRESENT ILLNESS:  This is an 87-year-old [place]er female with

history of multiple medical problems presented to emergency room with

symptoms of progressive shortness of breath over 2 days duration.  The

patient was evaluated in the emergency room where she was found to be in

congestive heart failure.  The patient was given diuretics and admitted for

further management.  Chest x-ray also showed possible bilateral lower lobe

infiltration and the patient was started on IV antibiotics.  Other review

of systems is negative.



ALLERGIES

NO KNOWN ALLERGIES.



MEDICATIONS

As per MAR.



SOCIAL HISTORY

No history of smoking, EtOH, or substance abuse.



FAMILY HISTORY

Noncontributory.



PAST MEDICAL HISTORY

Hypertension, type 2 diabetes mellitus, aortic stenosis, paroxysmal atrial

fibrillation.



PHYSICAL EXAMINATION

GENERAL:  The patient is in bed, not in cardiopulmonary distress at the

time of this examination.

VITAL SIGNS:  Blood pressure 120/67, temperature 97.6, respiratory rate 20,

and pulse 98.

HEENT:  Pupils equal, reactive to light.  Normal-appearing mucosa of the

conjunctivae. Oropharynx and nasal membrane mucosa.

NECK:  Supple.  No JVD.  No carotid bruit.  No lymph node.  No thyromegaly.

CHEST AND LUNGS:  Bilateral symmetrical expansion.  Good air exchange. 

Bilateral basilar rales.

CARDIOVASCULAR SYSTEM:  PMI not localized. S1 and S2 and positive ejection

systolic murmur along the aortic area.  ABDOMEN:  Normoactive bowel sounds.

No tenderness.  No organomegaly.  No masses.

EXTREMITIES:  No cyanosis, no clubbing, no edema.

CENTRAL NERVOUS SYSTEM:  Alert, awake, oriented x2.  No neurological

deficit could be appreciated.



ASSESSMENT

1.  Exacerbation of diastolic congestive heart failure, acute on chronic.

2.   Severe pulmonary hypertension.

3.   Aortic stenosis.

4.   Type 2 diabetes mellitus.

5.    Hypertension.



PLAN:  Continue current medications and diuretics and monitor electrolytes.







Discussed the patient's condition with her son at the bedside.











__________________________________________

Howard Day MD



DD:  01/09/2018 23:18:45

DT:  01/09/2018 23:20:22

Job # [id number]

## 2018-01-11 VITALS — RESPIRATION RATE: 20 BRPM

## 2018-01-11 RX ADMIN — TAZOBACTAM SODIUM AND PIPERACILLIN SODIUM SCH MLS/HR: 250; 2 INJECTION, SOLUTION INTRAVENOUS at 17:29

## 2018-01-11 RX ADMIN — INSULIN ASPART SCH: 100 INJECTION, SOLUTION INTRAVENOUS; SUBCUTANEOUS at 07:30

## 2018-01-11 RX ADMIN — TAZOBACTAM SODIUM AND PIPERACILLIN SODIUM SCH MLS/HR: 250; 2 INJECTION, SOLUTION INTRAVENOUS at 11:20

## 2018-01-11 RX ADMIN — IPRATROPIUM BROMIDE AND ALBUTEROL SULFATE PRN ML: .5; 3 SOLUTION RESPIRATORY (INHALATION) at 13:12

## 2018-01-11 RX ADMIN — TAZOBACTAM SODIUM AND PIPERACILLIN SODIUM SCH MLS/HR: 250; 2 INJECTION, SOLUTION INTRAVENOUS at 05:02

## 2018-01-11 RX ADMIN — DILTIAZEM HYDROCHLORIDE SCH: 120 CAPSULE, COATED, EXTENDED RELEASE ORAL at 09:48

## 2018-01-11 RX ADMIN — TAZOBACTAM SODIUM AND PIPERACILLIN SODIUM SCH MLS/HR: 250; 2 INJECTION, SOLUTION INTRAVENOUS at 22:04

## 2018-01-11 RX ADMIN — Medication SCH TAB: at 10:00

## 2018-01-11 RX ADMIN — IPRATROPIUM BROMIDE AND ALBUTEROL SULFATE PRN ML: .5; 3 SOLUTION RESPIRATORY (INHALATION) at 06:50

## 2018-01-11 RX ADMIN — INSULIN ASPART SCH UNIT: 100 INJECTION, SOLUTION INTRAVENOUS; SUBCUTANEOUS at 17:32

## 2018-01-11 RX ADMIN — INSULIN ASPART SCH UNIT: 100 INJECTION, SOLUTION INTRAVENOUS; SUBCUTANEOUS at 11:25

## 2018-01-11 RX ADMIN — INSULIN ASPART SCH: 100 INJECTION, SOLUTION INTRAVENOUS; SUBCUTANEOUS at 21:35

## 2018-01-11 NOTE — PN
DATE:  01/11/2018



DAILY PROGRESS NOTE



SUBJECTIVE:  Patient is seen today on 01/11/2018.  She has decreased

shortness of breath and cough.



PHYSICAL EXAMINATION:

VITAL SIGNS:  Blood pressure 110/60, temperature 98.5, respiratory rate 18,

and pulse 85.

HEENT:  Pupils equal, reactive to light.  Normal appearing mucosa of the

conjunctivae, oropharynx and nasal membrane mucosa.

NECK:  Supple.  No JVD.  No carotid bruit.  No lymph node.  No thyromegaly.

CHEST AND LUNGS:  Bilateral symmetrical expansion.  Good air exchange.  No

rales, no rhonchi.

CARDIOVASCULAR SYSTEM:  PMI not localized.  S1 and S2.  No additional

sounds.

ABDOMEN:  Normoactive bowel sounds.  No tenderness.  No organomegaly.  No

masses.

EXTREMITIES:  No cyanosis, no clubbing, no edema.

CENTRAL NERVOUS SYSTEM:  Alert, awake, oriented x3.  No neurological

deficit could be appreciated.



ASSESSMENT:

1.  Severe pulmonary hypertension with right-sided heart failure secondary

to left-sided diastolic failure.

2.  Type 2 diabetes mellitus.

3.  Atherosclerotic cardiovascular disease.



PLAN:  We will stop the Lasix and give the patient IV fluids as serum

creatinine today is 1.8.





__________________________________________

Howard Day MD





DD:  01/11/2018 21:22:35

DT:  01/11/2018 22:04:54

Job # [id number]

## 2018-01-11 NOTE — PN
DATE:  01/10/2018



DAILY PROGRESS NOTE



SUBJECTIVE:  Patient is seen today on 01/10/2018.  She is not in any

cardiopulmonary distress as she was seen today.



PHYSICAL EXAMINATION:

VITAL SIGNS:  Blood pressure is 94/48, temperature 97.7, respiratory rate

19 and pulse 89.

HEENT:  Pupils equal, reactive to light.  Normal appearing mucosa of the

conjunctivae, oropharynx and nasal membrane mucosa.

NECK:  Supple.  No JVD.  No carotid bruit.  No lymph node.  No thyromegaly.

CHEST AND LUNGS:  Bilateral symmetrical expansion.  Good air exchange. 

Bilateral rales.

CARDIOVASCULAR:  PMI not localized.  S1 and S2.  No additional sounds.

ABDOMEN:  Normoactive bowel sounds.  No tenderness.  No organomegaly.  No

masses.

EXTREMITIES:  No cyanosis, no clubbing, no edema.

CENTRAL NERVOUS SYSTEM:  Alert, awake, oriented x3.  No neurological

deficit could be appreciated.



ASSESSMENT:

1.  Exacerbation of congestive heart failure, acute on top chronic

diastolic.

2.  Aortic stenosis.

3.  Pulmonary hypertension.



PLAN:  Continue current diuretics and hold the blood pressure medications

for hypertension.  Monitor electrolytes.





__________________________________________

Christos MD Shay





DD:  01/10/2018 21:41:39

DT:  01/10/2018 21:44:51

Job # [id number]

## 2018-01-12 VITALS — OXYGEN SATURATION: 98 % | HEART RATE: 87 BPM | TEMPERATURE: 98.4 F

## 2018-01-12 VITALS — DIASTOLIC BLOOD PRESSURE: 68 MMHG | SYSTOLIC BLOOD PRESSURE: 95 MMHG

## 2018-01-12 LAB
BUN SERPL-MCNC: 32 MG/DL (ref 7–17)
CALCIUM SERPL-MCNC: 9 MG/DL (ref 8.6–10.4)
GFR NON-AFRICAN AMERICAN: 52

## 2018-01-12 RX ADMIN — INSULIN ASPART SCH: 100 INJECTION, SOLUTION INTRAVENOUS; SUBCUTANEOUS at 08:35

## 2018-01-12 RX ADMIN — INSULIN ASPART SCH: 100 INJECTION, SOLUTION INTRAVENOUS; SUBCUTANEOUS at 16:48

## 2018-01-12 RX ADMIN — TAZOBACTAM SODIUM AND PIPERACILLIN SODIUM SCH MLS/HR: 250; 2 INJECTION, SOLUTION INTRAVENOUS at 11:56

## 2018-01-12 RX ADMIN — Medication SCH TAB: at 11:56

## 2018-01-12 RX ADMIN — INSULIN ASPART SCH UNIT: 100 INJECTION, SOLUTION INTRAVENOUS; SUBCUTANEOUS at 12:49

## 2018-01-12 RX ADMIN — DILTIAZEM HYDROCHLORIDE SCH MG: 120 CAPSULE, COATED, EXTENDED RELEASE ORAL at 11:55

## 2018-01-12 RX ADMIN — TAZOBACTAM SODIUM AND PIPERACILLIN SODIUM SCH MLS/HR: 250; 2 INJECTION, SOLUTION INTRAVENOUS at 04:14

## 2018-01-12 NOTE — CP.PCM.PN
Subjective





- Date & Time of Evaluation


Date of Evaluation: 01/12/18


Time of Evaluation: 17:00





- Subjective


Subjective: 





PATIENT WAS ADMITTED FOR CHF, PULM HTN, AND AORTIC STENOSIS; AAOX3 ;COMPLAINING 

SORE THROAT AND COUGH; DENIES SOB OR CHEST PAIN; NO SIGN DISTRESS NOTED





Objective





- Vital Signs/Intake and Output


Vital Signs (last 24 hours): 


 











Temp Pulse Resp BP Pulse Ox


 


 98.4 F   87   20   95/68 L  98 


 


 01/12/18 15:45  01/12/18 15:45  01/12/18 15:45  01/12/18 16:27  01/12/18 15:45








Intake and Output: 


 











 01/12/18 01/12/18





 06:59 18:59


 


Intake Total 910 840


 


Output Total 300 


 


Balance 610 840














- Medications


Medications: 


 Current Medications





Albuterol/Ipratropium (Duoneb 3 Mg/0.5 Mg (3 Ml) Ud)  3 ml INH RQ6 PRN


   PRN Reason: Shortness of Breath


   Last Admin: 01/11/18 13:12 Dose:  3 ml


Albuterol/Ipratropium (Duoneb 3 Mg/0.5 Mg (3 Ml) Ud)  3 ml INH RQ6 [facility]


   Last Admin: 01/12/18 13:38 Dose:  3 ml


Apixaban (Eliquis)  2.5 mg PO Q12 [facility]


   Last Admin: 01/12/18 11:55 Dose:  2.5 mg


Aspirin (Ecotrin)  81 mg PO DAILY [facility]


   Last Admin: 01/12/18 11:55 Dose:  81 mg


Bisoprolol Fumarate (Zebeta)  5 mg PO DAILY [facility]


   Last Admin: 01/12/18 11:55 Dose:  5 mg


Diltiazem HCl (Cardizem Cd)  120 mg PO DAILY [facility]


   Last Admin: 01/12/18 11:55 Dose:  120 mg


Gabapentin (Neurontin)  300 mg PO BID [facility]


   Last Admin: 01/12/18 11:56 Dose:  300 mg


Glimepiride (Amaryl)  2 mg PO BID [facility]


   Last Admin: 01/12/18 11:55 Dose:  2 mg


Guaifenesin/Dextromethorphan (Robitussin Dm)  5 ml PO Q4H PRN


   PRN Reason: Cough


   Last Admin: 01/12/18 14:32 Dose:  5 ml


Hydrochlorothiazide (Microzide)  12.5 mg PO DAILY [facility]


   Last Admin: 01/12/18 11:55 Dose:  12.5 mg


Insulin Aspart (Novolog)  0 unit SC ACHS [facility]


   PRN Reason: Protocol


   Last Admin: 01/12/18 16:48 Dose:  Not Given


Losartan Potassium (Cozaar)  50 mg PO DAILY [facility]


   Last Admin: 01/12/18 12:08 Dose:  Not Given


Montelukast Sodium (Singulair)  10 mg PO HS [facility]


   Last Admin: 01/11/18 22:05 Dose:  10 mg


Multivitamins (Hexavitamin)  1 tab PO DAILY [facility]


   Last Admin: 01/12/18 11:56 Dose:  1 tab


Rosuvastatin Calcium (Crestor)  10 mg PO HS [facility]


   Last Admin: 01/11/18 22:05 Dose:  10 mg











- Labs


Labs: 


 





 01/08/18 15:45 





 01/12/18 14:58 











Assessment and Plan





- Assessment and Plan (Free Text)


Assessment: 





A/P


PATIENT IS SEEN AND EXAMINED AT THE BEDSIDE; CHEST XRAY STAT ORDER SHOW MINIMAL 

BILATERAL PLEURAL EFFUSION


BMP SHOW CREATINE IMPROVE TO 1.0 


PATIENT REFUSE TO STAY; PATIENT  AND PATIENT WANT TO GO HOME 


IV LASIX 20 MG STAT GIVEN AND 20 MEQ PO POTASSIUM PRIOR TO DC


DISCUSS WITH PMD DR PURI WHO AGREE WITH THE PLAN


FOLLOW UP WITH DR PURI IN HIS OFFICE IN WEEK ---CALL TO CONFIRM APPOINTMENT


CONTINUE ALL YOUR HOME MEDICATION AS ORDER 


NEW PRESCRIPTION GIVEN:


ELIQUIS 2.5 MG EVERY 12 HOUR BY MOUTH


GABAPENTIN 300 BY MOUTH TWICE A DAY 


TAKE YOUR BLOOD PRESSURE BEFOER TAKING YOUR LOSARTAN/HCTZ


IF THE TOP NUMBER(SYSTOLIC) IS BELOW 100 SKIP THE DOSE 


AND RECHECK THE BLOOD PRESSURE LATER BEFORE YOUR NEXT DOSE AND REPEAT THE 

PREVIOUS SCENARIO IF IT LOW 


IF BLOOD PRESSURE IS NORMAL CONTINUE TO TAKE AS ORDER 


EAT A BANANA EVERY OTHER DAY FOR THE NEXT 2 WEEKS FOR LOW POTASSIUM


CALL DR PURI OR GO THE EMERGENCY ROOM IF SYMPTOMS RETURN OR WORSENING


DISCUSS WITH PATIENT WHO AGREE AND VERBALIZED UNDERSTANDING

## 2018-01-12 NOTE — RAD
PROCEDURE:  CHEST RADIOGRAPH, 1 VIEW



HISTORY:

Shortness of breath 



COMPARISON:

01/08/2018.



FINDINGS:



LUNGS:

There is redemonstration of bilateral lower lobe airspace disease.  

There is mild pulmonary venous congestion.



PLEURA:

No pneumothorax suspect bilateral pleural effusions.



CARDIOVASCULAR:

There is cardiomegaly.



OSSEOUS STRUCTURES:

No significant abnormalities.



VISUALIZED UPPER ABDOMEN:

Normal.



OTHER FINDINGS:

None. 



IMPRESSION:

Persistent cardiomegaly and pulmonary venous congestion. And 

bibasilar 



Bibasilar airspace disease which may represent atelectasis or 

pneumonia. Also suspected are bilateral pleural effusions.

## 2018-01-13 NOTE — CP.PCM.CON
History of Present Illness





- History of Present Illness


History of Present Illness: 





87 year old female, whose PMHx includes CHF, Hypertension, Aortic Stenosis, 

Mitral Valve Regurgitation, presents to the ED for evaluation of shortness of 

breath which began around 2 weeks ago. Patient satates her stmproms have been 

worsening. As per , who is at bedside, patient has been compliant with 

her medications. Patient denies fever, chills, chest pain, cough. 





Chief Complaint (Nursing): Shortness Of Breath


History Per: Patient, Family ( )


History/Exam Limitations: no limitations


Onset/Duration Of Symptoms: Other (2 weeks )


Current Symptoms Are (Timing): Worse


Current Respiratory Medications: See Home Med List


Associated Symptoms: denies: Fever, Chills, Chest Pain, Bloody Cough, 

Productive Cough


Additional History Per: Patient








- Medical History


PMH: Arthritis, Asthma, CHF, HTN, Hypercholesterolemia


Surgical History: Cholecystectomy





- [facility] Procedures








ENDOSC POLYPECTOMY OF LG INTEST (07/20/07)








Family History: States: Unknown Family Hx





- Social History


Hx Alcohol Use: No


Hx Substance Use: No





- Immunization History


Hx Tetanus Toxoid Vaccination: Yes


Hx Influenza Vaccination: Yes


Hx Pneumococcal Vaccination: Yes





Review Of Systems


Constitutional: Negative for: Fever, Chills


Cardiovascular: Negative for: Chest Pain


Respiratory: Positive for: Shortness of Breath.  Negative for: Cough





Physical Exam





- Physical Exam


Appears: Non-toxic, No Acute Distress, Other (elderly obese female )


Skin: Normal Color, Warm, Dry


Head: Atraumatic, Normacephalic


Eye(s): bilateral: Normal Inspection


Oral Mucosa: Moist


Neck: Supple


Chest: Symmetrical, No Deformity, No Tenderness


Cardiovascular: Rhythm Regular, No Murmur


Respiratory: Normal Breath Sounds, No Rales, No Rhonchi, No Wheezing


Extremity: Normal ROM, Capillary Refill (less than 2 seconds ), Other (mild 

edema to b/l lower extremities )


Neurological/Psych: Oriented x3, Normal Speech, Normal Cognition


Gait: Steady





Past Patient History





- Past Social History


Smoking Status: Never Smoked





- CARDIAC


Hx Congestive Heart Failure: Yes


Hx Hypercholesterolemia: Yes


Hx Hypertension: Yes





- PULMONARY


Hx Asthma: Yes





- NEUROLOGICAL


Hx Neurological Disorder: No





- HEENT


Hx Cataracts: Yes





- ENDOCRINE/METABOLIC


Hx Diabetes Mellitus Type 2: Yes





- HEMATOLOGICAL/ONCOLOGICAL


Hx Blood Disorders: No


Hx Blood Transfusions: No





- INTEGUMENTARY


Hx Dermatological Problems: No





- MUSCULOSKELETAL/RHEUMATOLOGICAL


Hx Arthritis: Yes





- GASTROINTESTINAL


Hx Gastrointestinal Disorders: Yes (Hx Cholecystectomy)





- PSYCHIATRIC


Hx Substance Use: No





- SURGICAL HISTORY


Hx Cholecystectomy: Yes





- ANESTHESIA


Hx Anesthesia: Yes


Hx Anesthesia Reactions: No


Hx Malignant Hyperthermia: No


Has any member of the family had a problem w/ anesthesia?: No





Meds


Home Medications: 


 Home Medication List











 Medication  Instructions  Recorded  Confirmed  Type


 


Apixaban [Eliquis] 2.5 mg PO Q12 #60 tab 01/12/18  Rx


 


Gabapentin 300 mg PO BID #60 tablet 01/12/18  Rx











Allergies/Adverse Reactions: 


 Allergies











Allergy/AdvReac Type Severity Reaction Status Date / Time


 


No Known Allergies Allergy   Verified 01/08/18 12:52














Results





- Vital Signs


Recent Vital Signs: 


 Last Vital Signs











Temp  98.4 F   01/12/18 15:45


 


Pulse  87   01/12/18 15:45


 


Resp  20   01/12/18 15:45


 


BP  95/68 L  01/12/18 16:27


 


Pulse Ox  98   01/12/18 15:45














- Labs


Result Diagrams: 


 01/08/18 15:45





 01/12/18 14:58





Assessment & Plan





- Assessment and Plan (Free Text)


Assessment: 





1. Dyspnea improving


2. Moderate to severe MR


3. Moderate AS





Continue current meds


Covering for Dr. Rossi

## 2018-01-13 NOTE — DS
REASON FOR ADMISSION:  An 87-year-old [place] female with history of

multiple medical problems, was admitted for exacerbation of congestive

heart failure, acute and chronic, diastolic and systolic with severe

pulmonary retention.



COURSE OF HOSPITALIZATION:  Patient was admitted to Intensive Care Unit and

she was started on IV diuretics.  Electrolytes and kidney function was

monitored and patient had elevation of serum creatinine.  The patient was

gently hydrated and she felt better and she was discharged home to continue

her preadmission medications including Lasix and her anticoagulant as well

as her diabetic medications.  The patient's daughter was at the bedside and

discussed with her all the details of the patient's condition.  The patient

wanted to go home also on the day of discharge.



FINAL DIAGNOSES:

1.  Exacerbation of congestive heart failure, acute and chronic, systolic

and diastolic.

2.  Type 2 diabetes mellitus.

3.  Hypertension.

4.  Osteoarthritis.





__________________________________________

[facility] MD Shay





DD:  01/12/2018 21:43:11

DT:  01/12/2018 21:44:52

Job # [id number]